# Patient Record
Sex: MALE | URBAN - METROPOLITAN AREA
[De-identification: names, ages, dates, MRNs, and addresses within clinical notes are randomized per-mention and may not be internally consistent; named-entity substitution may affect disease eponyms.]

---

## 2024-01-01 ENCOUNTER — HOSPICE ADMISSION (OUTPATIENT)
Dept: HOME HOSPICE | Facility: HOSPICE | Age: 89
End: 2024-01-01
Payer: MEDICARE

## 2024-01-01 ENCOUNTER — HOME CARE VISIT (OUTPATIENT)
Dept: HOME HEALTH SERVICES | Facility: HOME HEALTHCARE | Age: 89
End: 2024-01-01
Payer: MEDICARE

## 2024-05-02 NOTE — CASE COMMUNICATION
Req was for IPU from [facility]. Rolando Butler  [birth year] 89 yo male. Went to the hospital dt AMS. Long complicated course. Was found to be in ARF, Septic shock from Choledocholithiasis, Acute PE of R main stem, RSV positive, Was intubated and on pressors. Now on 6 L NC, off pressors. Had Gia tube placed 4. ERCP . Newly dx oral SCC fairly large tongue mass, having recurrent asp with mucus plugging and lobular collapse,  L loculated pleural effusion. Transitioned to comfort care yesterday. Last Labs K 2.6, Bili 0.4, alk phos 188, alb 2.4. He has IV comfort meds ordered none given yet. PPS 20  Approve IPU?    Approved for IPU by Dr Arriaga 5.2.24  DX Acute respiratory failure with hypoxia

## 2024-05-09 ENCOUNTER — HOME CARE VISIT (OUTPATIENT)
Dept: HOME HOSPICE | Facility: HOSPICE | Age: 89
End: 2024-05-09
Payer: MEDICARE

## 2024-05-09 NOTE — CASE COMMUNICATION
"Rolando Butler, Bereavement Final IDG [id number] FAMILY DECLINED BEREAVEMENT FOLLOW-UP   : 1935  SOC: 24  DOD: 24  Diagnosis: Respiratory Failure  Primary: Son - Andreas Marshall was an 88 year old man at the IPU. Son Andreas and his wife Sailaja visited. Rolando  when his two sons Andreas and Zeb were young. Rolando was born and raised in the [facility] and came to [place] during [place]. Rolando was known as the \"ma yor\" of his neighborhood in [facility]. Andreas described him as a man that knew everyone, with a \"magnetic\" personality. Rolando was [place] [employer] orthodox. SOS was requested from Father Mark. Andreas encouraged Zeb to come sooner and not wait.      TOD: Condolence visit was made to the family. Son was supported by his girlfriend. He talked about how quickly Rolando went from diagnosis to death and all the times that his condition was upgrad ed to worse than they thought. During the visit, second son arrived with Rolando's grandson.  left the family to give them time to process and let them know staff is there as support. Family declined bereavement follow-up.    Call Assignments: Family Declined Bereavement Follow-Up.  "